# Patient Record
Sex: MALE | Race: WHITE | Employment: FULL TIME | URBAN - METROPOLITAN AREA
[De-identification: names, ages, dates, MRNs, and addresses within clinical notes are randomized per-mention and may not be internally consistent; named-entity substitution may affect disease eponyms.]

---

## 2023-01-16 SDOH — HEALTH STABILITY: PHYSICAL HEALTH: ON AVERAGE, HOW MANY DAYS PER WEEK DO YOU ENGAGE IN MODERATE TO STRENUOUS EXERCISE (LIKE A BRISK WALK)?: 4 DAYS

## 2023-01-16 SDOH — HEALTH STABILITY: PHYSICAL HEALTH: ON AVERAGE, HOW MANY MINUTES DO YOU ENGAGE IN EXERCISE AT THIS LEVEL?: 60 MIN

## 2023-01-16 NOTE — PROGRESS NOTES
[address]  DR[address] AMIRAH WOO  [address], [address]  [place], [facility] [address]      Date of Visit:  2023  Patient Name: Michele Daugherty   Patient :  1985     CHIEF COMPLAINT:     Michele Daugherty is a 40 y.o. male who presents today for an general visit to be evaluated for the following condition(s):  Chief Complaint   Patient presents with    New Patient     Pt has epididymitis, symptoms began 1mo ago. Dx by Urgent Care. ATB treatment was given. Symptoms improved but returned. Symptoms worsen with sitting longer. REVIEW OF SYSTEM      Review of Systems   Constitutional:  Negative for chills and fever. HENT:  Negative for congestion, postnasal drip and sore throat. Respiratory:  Negative for chest tightness and shortness of breath. Cardiovascular:  Negative for chest pain. Gastrointestinal:  Negative for abdominal distention and abdominal pain. Genitourinary:  Positive for testicular pain. Negative for difficulty urinating. HISTORY OF PRESENT ILLNESS     37M here for testicular pain and establishment of care. Was treated for epididmymitis 1 month. Had UA which showed trace amounts of protein and blood. Was started on doxycycline for 10 days with improvement of his symptoms. He states he did not receive CTX injection, however. Had swelling along the left testicle that had improved. Had some residual soreness, however, since then localized to left posterior testicle. He noticed that if he sat on a hard bottom chair it felt better, but soft cushions would worsen his pain. He did switch to a brief with better scrotal support, which helped. Recently he did drive to [facility] which worsened his scrotal pain while in the car. Then also flew to [place] for work and had similar symtpoms while on the plane. No pain with walking. No extreme pain, abd pain,, or n/v. Has had relief with ibuprofen.  He thinks running at the onset of his pain may have contributed, so he has since cut back on his workout intensity. REVIEWED INFORMATION      No Known Allergies    There is no problem list on file for this patient. History reviewed. No pertinent past medical history. History reviewed. No pertinent surgical history. Social History     Socioeconomic History    Marital status:      Spouse name: None    Number of children: None    Years of education: None    Highest education level: None   Tobacco Use    Smoking status: Never    Smokeless tobacco: Never   Vaping Use    Vaping Use: Never used   Substance and Sexual Activity    Alcohol use: Yes     Comment: Socially    Drug use: Never     Social Determinants of Health     Financial Resource Strain: Low Risk     Difficulty of Paying Living Expenses: Not hard at all   Food Insecurity: No Food Insecurity    Worried About Running Out of Food in the Last Year: Never true    Ran Out of Food in the Last Year: Never true   Physical Activity: Sufficiently Active    Days of Exercise per Week: 4 days    Minutes of Exercise per Session: 60 min   Intimate Partner Violence: Not At Risk    Fear of Current or Ex-Partner: No    Emotionally Abused: No    Physically Abused: No    Sexually Abused: No        History reviewed. No pertinent family history. PHYSICAL EXAM     /64   Pulse 77   Ht 6' 3\" (1.905 m)   Wt 205 lb (93 kg)   SpO2 99%   BMI 25.62 kg/m²    Physical Exam  Genitourinary:     Penis: Normal.       Testes: Normal.       ASSESSMENT/PLAN     1. Epididymitis  Treated previously 1 month ago for presumable epididymitis with resolution of his symptoms. Was only started on doxy and not CTX. However, he is still having occasion scrotal discomfort since then. Will get urinary studies and start doxy. CTX not available at office, will have patient go to [facility] to receive treatment. Scrotal US ordered in case symptoms not improving.  Follow up in 2 weeks, consider flouroquinolone treatment if not improving.   - C.trachomatis N.gonorrhoeae DNA, Urine; Future  - Urinalysis with Reflex to Culture; Future  - POCT Urinalysis no Micro  - doxycycline hyclate (VIBRA-TABS) 100 MG tablet; Take 1 tablet by mouth 2 times daily for 10 days  Dispense: 20 tablet; Refill: 0  - cefTRIAXone (ROCEPHIN) 500 MG injection; Inject 500 mg into the muscle once for 1 dose  Dispense: 1 each; Refill: 0  - US SCROTUM AND TESTICLES; Future    Return in about 2 weeks (around 1/31/2023) for f/u .     COMMUNICATION:       Electronically signed by Analilia Senior MD on 1/17/2023 at 7:55 AM

## 2023-01-17 ENCOUNTER — HOSPITAL ENCOUNTER (OUTPATIENT)
Age: 38
Setting detail: SPECIMEN
Discharge: HOME | End: 2023-01-17

## 2023-01-17 ENCOUNTER — OFFICE VISIT (OUTPATIENT)
Dept: FAMILY MEDICINE CLINIC | Age: 38
End: 2023-01-17

## 2023-01-17 VITALS
OXYGEN SATURATION: 99 % | BODY MASS INDEX: 25.49 KG/M2 | DIASTOLIC BLOOD PRESSURE: 64 MMHG | HEIGHT: 75 IN | WEIGHT: 205 LBS | HEART RATE: 77 BPM | SYSTOLIC BLOOD PRESSURE: 100 MMHG

## 2023-01-17 DIAGNOSIS — N45.1 EPIDIDYMITIS: ICD-10-CM

## 2023-01-17 DIAGNOSIS — N45.1 EPIDIDYMITIS: Primary | ICD-10-CM

## 2023-01-17 RX ORDER — CEFTRIAXONE 500 MG/1
500 INJECTION, POWDER, FOR SOLUTION INTRAMUSCULAR; INTRAVENOUS ONCE
Qty: 1 EACH | Refills: 0
Start: 2023-01-17 | End: 2023-01-17 | Stop reason: CLARIF

## 2023-01-17 RX ORDER — DOXYCYCLINE HYCLATE 100 MG
100 TABLET ORAL 2 TIMES DAILY
Qty: 20 TABLET | Refills: 0 | Status: SHIPPED | OUTPATIENT
Start: 2023-01-17 | End: 2023-01-27

## 2023-01-17 RX ORDER — CEFTRIAXONE 500 MG/1
500 INJECTION, POWDER, FOR SOLUTION INTRAMUSCULAR; INTRAVENOUS ONCE
Status: COMPLETED | OUTPATIENT
Start: 2023-01-17 | End: 2023-01-17

## 2023-01-17 RX ADMIN — CEFTRIAXONE 500 MG: 500 INJECTION, POWDER, FOR SOLUTION INTRAMUSCULAR; INTRAVENOUS at 09:47

## 2023-01-17 SDOH — ECONOMIC STABILITY: FOOD INSECURITY: WITHIN THE PAST 12 MONTHS, THE FOOD YOU BOUGHT JUST DIDN'T LAST AND YOU DIDN'T HAVE MONEY TO GET MORE.: NEVER TRUE

## 2023-01-17 SDOH — ECONOMIC STABILITY: FOOD INSECURITY: WITHIN THE PAST 12 MONTHS, YOU WORRIED THAT YOUR FOOD WOULD RUN OUT BEFORE YOU GOT MONEY TO BUY MORE.: NEVER TRUE

## 2023-01-17 ASSESSMENT — PATIENT HEALTH QUESTIONNAIRE - PHQ9
SUM OF ALL RESPONSES TO PHQ QUESTIONS 1-9: 0
SUM OF ALL RESPONSES TO PHQ QUESTIONS 1-9: 0
2. FEELING DOWN, DEPRESSED OR HOPELESS: 0
1. LITTLE INTEREST OR PLEASURE IN DOING THINGS: 0
SUM OF ALL RESPONSES TO PHQ9 QUESTIONS 1 & 2: 0
SUM OF ALL RESPONSES TO PHQ QUESTIONS 1-9: 0
SUM OF ALL RESPONSES TO PHQ QUESTIONS 1-9: 0

## 2023-01-17 ASSESSMENT — SOCIAL DETERMINANTS OF HEALTH (SDOH): HOW HARD IS IT FOR YOU TO PAY FOR THE VERY BASICS LIKE FOOD, HOUSING, MEDICAL CARE, AND HEATING?: NOT HARD AT ALL

## 2023-01-17 ASSESSMENT — ENCOUNTER SYMPTOMS
CHEST TIGHTNESS: 0
ABDOMINAL DISTENTION: 0
ABDOMINAL PAIN: 0
SHORTNESS OF BREATH: 0
SORE THROAT: 0

## 2023-01-18 LAB
C. TRACHOMATIS DNA ,URINE: NEGATIVE
N. GONORRHOEAE DNA, URINE: NEGATIVE
SPECIMEN DESCRIPTION: NORMAL

## 2023-01-30 NOTE — PROGRESS NOTES
[address]  [address] AMIRAH WOO  [address], [address]  [facility], [address]      Date of Visit:  2023  Patient Name: Jonas Bernard   Patient :  1985     CHIEF COMPLAINT:     Jonas Bernard is a 40 y.o. male who presents today for an general visit to be evaluated for the following condition(s):  Chief Complaint   Patient presents with    Follow-up     2week f/u. Symptoms have improved. Resolved after injection in office, but returned. Symptoms more bothersome since ATB finished. REVIEW OF SYSTEM      Review of Systems   Constitutional:  Negative for chills and fever. HENT:  Negative for congestion, postnasal drip and sore throat. Respiratory:  Negative for chest tightness and shortness of breath. Cardiovascular:  Negative for chest pain. Gastrointestinal:  Negative for abdominal distention and abdominal pain. Genitourinary:  Positive for testicular pain. Negative for difficulty urinating. HISTORY OF PRESENT ILLNESS     37M here for testicular pain follow up. His symptoms resolved for about 4 days after receiving the CTX. He also completed the doxy. However, his symptoms returned in less severity, again localized to the left testicle. His G/C came back negative, but he didn't complete the UA or scrotal US.     -----    Was treated for epididmymitis 1 month. Had UA which showed trace amounts of protein and blood. Was started on doxycycline for 10 days with improvement of his symptoms. He states he did not receive CTX injection, however. Had swelling along the left testicle that had improved. Had some residual soreness, however, since then localized to left posterior testicle. He noticed that if he sat on a hard bottom chair it felt better, but soft cushions would worsen his pain. He did switch to a brief with better scrotal support, which helped.  Recently he did drive to [place] which worsened his scrotal pain while in the car. Then also flew to [place] for work and had similar symtpoms while on the plane. No pain with walking. No extreme pain, abd pain,, or n/v. Has had relief with ibuprofen. He thinks running at the onset of his pain may have contributed, so he has since cut back on his workout intensity. REVIEWED INFORMATION      No Known Allergies    There is no problem list on file for this patient. History reviewed. No pertinent past medical history. History reviewed. No pertinent surgical history. Social History     Socioeconomic History    Marital status:      Spouse name: None    Number of children: None    Years of education: None    Highest education level: None   Tobacco Use    Smoking status: Never    Smokeless tobacco: Never   Vaping Use    Vaping Use: Never used   Substance and Sexual Activity    Alcohol use: Yes     Comment: Socially    Drug use: Never     Social Determinants of Health     Financial Resource Strain: Low Risk     Difficulty of Paying Living Expenses: Not hard at all   Food Insecurity: No Food Insecurity    Worried About Running Out of Food in the Last Year: Never true    Ran Out of Food in the Last Year: Never true   Physical Activity: Sufficiently Active    Days of Exercise per Week: 4 days    Minutes of Exercise per Session: 60 min   Intimate Partner Violence: Not At Risk    Fear of Current or Ex-Partner: No    Emotionally Abused: No    Physically Abused: No    Sexually Abused: No        History reviewed. No pertinent family history. PHYSICAL EXAM     /62   Pulse 72   Wt 207 lb (93.9 kg)   SpO2 99%   BMI 25.87 kg/m²    Physical Exam  Constitutional:       Appearance: Normal appearance. HENT:      Head: Atraumatic. Cardiovascular:      Rate and Rhythm: Normal rate and regular rhythm. Pulses: Normal pulses. Heart sounds: Normal heart sounds. No murmur heard. No friction rub. No gallop.    Pulmonary:      Effort: Pulmonary effort is normal. No respiratory distress. Breath sounds: Normal breath sounds. Genitourinary:     Penis: Normal.       Testes: Normal.   Neurological:      Mental Status: He is alert. Psychiatric:         Mood and Affect: Mood normal.       ASSESSMENT/PLAN     1. Scrotal pain  His symptoms improved but then returned after completing CTX and doxy. Will get UA and scrotal US ordered from last visit. In the event that his workup is negative, will have patient establish with urology for further workup. Continue with NSAIDs for pain relief at this time. - Scott Wilcox MD, Urology, [facility]      Return if symptoms worsen or fail to improve.     COMMUNICATION:       Electronically signed by Allie Braun MD on 1/31/2023 at 7:21 AM

## 2023-01-31 ENCOUNTER — OFFICE VISIT (OUTPATIENT)
Dept: FAMILY MEDICINE CLINIC | Age: 38
End: 2023-01-31
Payer: COMMERCIAL

## 2023-01-31 ENCOUNTER — HOSPITAL ENCOUNTER (OUTPATIENT)
Age: 38
Setting detail: SPECIMEN
Discharge: HOME | End: 2023-01-31

## 2023-01-31 VITALS
DIASTOLIC BLOOD PRESSURE: 62 MMHG | OXYGEN SATURATION: 99 % | BODY MASS INDEX: 25.87 KG/M2 | SYSTOLIC BLOOD PRESSURE: 112 MMHG | HEART RATE: 72 BPM | WEIGHT: 207 LBS

## 2023-01-31 DIAGNOSIS — N50.82 SCROTAL PAIN: Primary | ICD-10-CM

## 2023-01-31 DIAGNOSIS — N45.1 EPIDIDYMITIS: ICD-10-CM

## 2023-01-31 LAB
BILIRUBIN URINE: NEGATIVE
COLOR: YELLOW
COMMENT UA: NORMAL
GLUCOSE URINE: NEGATIVE
KETONES, URINE: NEGATIVE
LEUKOCYTE ESTERASE, URINE: NEGATIVE
NITRITE, URINE: NEGATIVE
PH UA: 6 (ref 5–8)
PROTEIN UA: NEGATIVE
SPECIFIC GRAVITY UA: 1.02 (ref 1–1.03)
TURBIDITY: CLEAR
URINE HGB: NEGATIVE
UROBILINOGEN, URINE: NORMAL

## 2023-01-31 PROCEDURE — 99213 OFFICE O/P EST LOW 20 MIN: CPT | Performed by: STUDENT IN AN ORGANIZED HEALTH CARE EDUCATION/TRAINING PROGRAM

## 2023-01-31 ASSESSMENT — ENCOUNTER SYMPTOMS
SORE THROAT: 0
ABDOMINAL PAIN: 0
ABDOMINAL DISTENTION: 0
SHORTNESS OF BREATH: 0
CHEST TIGHTNESS: 0

## 2023-02-01 ENCOUNTER — HOSPITAL ENCOUNTER (OUTPATIENT)
Dept: ULTRASOUND IMAGING | Age: 38
Discharge: HOME | End: 2023-02-03
Payer: COMMERCIAL

## 2023-02-01 DIAGNOSIS — N45.1 EPIDIDYMITIS: ICD-10-CM

## 2023-02-01 PROCEDURE — 76870 US EXAM SCROTUM: CPT
